# Patient Record
Sex: MALE | Race: BLACK OR AFRICAN AMERICAN | Employment: FULL TIME | URBAN - METROPOLITAN AREA
[De-identification: names, ages, dates, MRNs, and addresses within clinical notes are randomized per-mention and may not be internally consistent; named-entity substitution may affect disease eponyms.]

---

## 2021-10-14 ENCOUNTER — OFFICE VISIT (OUTPATIENT)
Dept: ORTHOPEDIC SURGERY | Age: 46
End: 2021-10-14
Payer: OTHER GOVERNMENT

## 2021-10-14 VITALS
RESPIRATION RATE: 16 BRPM | OXYGEN SATURATION: 100 % | HEIGHT: 75 IN | TEMPERATURE: 97.3 F | WEIGHT: 263 LBS | BODY MASS INDEX: 32.7 KG/M2 | HEART RATE: 83 BPM

## 2021-10-14 DIAGNOSIS — M25.562 CHRONIC PAIN OF BOTH KNEES: Primary | ICD-10-CM

## 2021-10-14 DIAGNOSIS — S83.207A POSITIVE MCMURRAY TEST OF LEFT KNEE, INITIAL ENCOUNTER: ICD-10-CM

## 2021-10-14 DIAGNOSIS — M17.11 ARTHRITIS OF RIGHT KNEE: ICD-10-CM

## 2021-10-14 DIAGNOSIS — M23.92 LOCKING OF LEFT KNEE: ICD-10-CM

## 2021-10-14 DIAGNOSIS — G89.29 CHRONIC PAIN OF BOTH KNEES: Primary | ICD-10-CM

## 2021-10-14 DIAGNOSIS — M25.561 CHRONIC PAIN OF BOTH KNEES: Primary | ICD-10-CM

## 2021-10-14 DIAGNOSIS — M17.10 PATELLOFEMORAL ARTHRITIS: ICD-10-CM

## 2021-10-14 PROCEDURE — 20610 DRAIN/INJ JOINT/BURSA W/O US: CPT | Performed by: ORTHOPAEDIC SURGERY

## 2021-10-14 PROCEDURE — 73564 X-RAY EXAM KNEE 4 OR MORE: CPT | Performed by: ORTHOPAEDIC SURGERY

## 2021-10-14 PROCEDURE — 99204 OFFICE O/P NEW MOD 45 MIN: CPT | Performed by: ORTHOPAEDIC SURGERY

## 2021-10-14 RX ORDER — BETAMETHASONE SODIUM PHOSPHATE AND BETAMETHASONE ACETATE 3; 3 MG/ML; MG/ML
6 INJECTION, SUSPENSION INTRA-ARTICULAR; INTRALESIONAL; INTRAMUSCULAR; SOFT TISSUE ONCE
Status: COMPLETED | OUTPATIENT
Start: 2021-10-14 | End: 2021-10-14

## 2021-10-14 RX ORDER — AMLODIPINE BESYLATE 5 MG/1
5 TABLET ORAL DAILY
COMMUNITY

## 2021-10-14 RX ADMIN — BETAMETHASONE SODIUM PHOSPHATE AND BETAMETHASONE ACETATE 6 MG: 3; 3 INJECTION, SUSPENSION INTRA-ARTICULAR; INTRALESIONAL; INTRAMUSCULAR; SOFT TISSUE at 09:03

## 2021-10-14 NOTE — LETTER
NOTIFICATION RETURN TO WORK / SCHOOL    10/14/2021 9:03 AM    Mr. Bharath Davison  [email or web address] [place] [address]      To Whom It May Concern:    Bharath Davison is currently under the care of [address] [place] [address]. He was seen in office today, 10/14/2021. Please excuse his absence from work today. If there are questions or concerns please have the patient contact our office.         Sincerely,      Norah Montiel MD

## 2021-10-14 NOTE — PROGRESS NOTES
Patient: Gladys Handley                MRN: [id number]       SSN: [id number]  YOB: 1975        AGE: 40 y.o. SEX: male  Body mass index is 32.87 kg/m². 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 Bharat Meneses they did some chondroplasty in the lateral and patellofemoral compartments he is not having mechanical symptoms on the right side just pain on the left knee is having mechanical symptoms catching locking giving way happens on a daily basis.   No groin pain otherwise feeling well arthritis does tend to run in the family    He is a young gentleman 40 and he was very active in sports for years    Juve Smith today is pleasant enough gentleman both hips rotate well both feet are warm well perfused right knee is well-healed incision negligible effusion some patellofemoral irritation and crepitus with terminal extension but the medial lateral joint lines are not particularly tender on the left knee he is got good motion mild effusion more than on the right and he has a positive Gary's test with actually both medially and laterally with a click and some patellofemoral rub with terminal extension calf nontender Adam Boroughs' sign is negative both feet warm well perfused and the hips are noncontributory    Review of his x-rays today AP tunnel lateral skyline of both knees on 10/14/2021 confirm advancing patellofemoral arthritis bilaterally and I think on the left side clinically is based on history and physical is got meniscal tears I recommend MRI for the left knee right knee I recommend nonoperative measures including NSAIDs and injections which start with a cortisone injection right knee injected as per protocol well-tolerated he is can return to see us in a few weeks after the MRI for his left knee he may be looking at an arthroscopy and eventual total knee replacement but it is too soon we did have a discussion regarding surgery and will base this on the MRI result    REVIEW OF SYSTEMS:      CON: negative  EYE: negative   ENT: negative  RESP: negative  GI:    negative   :  negative  MSK: Positive  A twelve point review of systems was completed, positives noted and all other systems were reviewed and are negative          Past Medical History:   Diagnosis Date    Hypertension        History reviewed. No pertinent family history. Current Outpatient Medications   Medication Sig Dispense Refill    amLODIPine (NORVASC) 5 mg tablet Take 5 mg by mouth daily. Current Facility-Administered Medications   Medication Dose Route Frequency Provider Last Rate Last Admin    betamethasone (CELESTONE) injection 6 mg  6 mg Intra artICUlar ONCE Renu Calderón MD           No Known Allergies    Past Surgical History:   Procedure Laterality Date    HX KNEE ARTHROSCOPY         Social History     Socioeconomic History    Marital status: UNKNOWN     Spouse name: Not on file    Number of children: Not on file    Years of education: Not on file    Highest education level: Not on file   Occupational History    Not on file   Tobacco Use    Smoking status: Current Every Day Smoker    Smokeless tobacco: Never Used   Vaping Use    Vaping Use: Every day   Substance and Sexual Activity    Alcohol use: Yes    Drug use: Never    Sexual activity: Not on file   Other Topics Concern    Not on file   Social History Narrative    Not on file     Social Determinants of Health     Financial Resource Strain:     Difficulty of Paying Living Expenses:    Food Insecurity:     Worried About Running Out of Food in the Last Year:     [address] in the Last Year:    Transportation Needs:     Lack of Transportation (Medical):      Lack of Transportation (Non-Medical):    Physical Activity:     Days of Exercise per Week:     Minutes of Exercise per Session:    Stress:     Feeling of Stress :    Social Connections:     Frequency of Communication with Friends and Family:     Frequency of Social Gatherings with Friends and Family:     Attends [employer]oration[employer] Services:     Active Member of Clubs or Organizations:     Attends Club or Organization Meetings:     Marital Status:    Intimate Partner Violence:     Fear of Current or Ex-Partner:     Emotionally Abused:     Physically Abused:     Sexually Abused:        Visit Vitals  Pulse 83   Temp 97.3 °F (36.3 °C)   Resp 16   Ht 6' 3\" (1.905 m)   Wt 119.3 kg (263 lb)   SpO2 100%   BMI 32.87 kg/m²         PHYSICAL EXAMINATION:  GENERAL: Alert and oriented x3, in no acute distress, well-developed, well-nourished, afebrile. HEART: No JVD. EYES: No scleral icterus   NECK: No significant lymphadenopathy   LUNGS: No respiratory compromise or indrawing  ABDOMEN: Soft, non-tender, non-distended. Note: This note was completed using voice recognition software. Any typographical/name errors or mistakes are unintentional.    Electronically signed by: MD LETICIA Brewer, Esau Rayo M.D., have reviewed the history, physical, and have updated the allergic reactions for Bharath Davison. TIME OUT performed immediately prior to the start of procedure:  Bonifacio Head M.D., have performed the following reviews on Bharath Davison prior to the start of the procedure:    - Patient was identified by name and date of birth  - Agreement on procedure being performed was verified  - Risks and benefits explained to the patient  - Patient was positioned for comfort  - Consent was signed and verified  - Patient was advised regarding risks of bruising, bleeding, infection and pain    Time: 8:57 AM     Body Part: intra-articular injection of right knee.     Medication and Dose: 1mL Celestone preparation, i.e. 6 mg, and 3 mL 1% lidocaine    Date of Procedure: 10/14/21    PROCEDURE PERFORMED BY : Carmen Pang M.D., [facility])    Provider Assisted by: Tacos Dubon    Patient assisted by: self    Patient tolerated procedure well with no complications

## 2021-10-21 DIAGNOSIS — M23.92 LOCKING OF LEFT KNEE: ICD-10-CM

## 2021-10-21 DIAGNOSIS — S83.207A POSITIVE MCMURRAY TEST OF LEFT KNEE, INITIAL ENCOUNTER: ICD-10-CM

## 2021-11-09 ENCOUNTER — HOSPITAL ENCOUNTER (OUTPATIENT)
Dept: MRI IMAGING | Age: 46
Discharge: HOME | End: 2021-11-09
Attending: ORTHOPAEDIC SURGERY
Payer: OTHER GOVERNMENT

## 2021-11-09 PROCEDURE — 73721 MRI JNT OF LWR EXTRE W/O DYE: CPT

## 2023-02-01 RX ORDER — AMLODIPINE BESYLATE 5 MG/1
5 TABLET ORAL DAILY
COMMUNITY